# Patient Record
Sex: FEMALE
[De-identification: names, ages, dates, MRNs, and addresses within clinical notes are randomized per-mention and may not be internally consistent; named-entity substitution may affect disease eponyms.]

---

## 2023-07-31 PROBLEM — Z00.00 ENCOUNTER FOR PREVENTIVE HEALTH EXAMINATION: Status: ACTIVE | Noted: 2023-07-31

## 2023-08-10 ENCOUNTER — APPOINTMENT (OUTPATIENT)
Dept: ENDOCRINOLOGY | Facility: CLINIC | Age: 58
End: 2023-08-10
Payer: COMMERCIAL

## 2023-08-10 VITALS
HEART RATE: 72 BPM | WEIGHT: 161 LBS | HEIGHT: 61 IN | SYSTOLIC BLOOD PRESSURE: 118 MMHG | DIASTOLIC BLOOD PRESSURE: 80 MMHG | BODY MASS INDEX: 30.4 KG/M2

## 2023-08-10 DIAGNOSIS — E66.9 TYPE 2 DIABETES MELLITUS WITH OTHER SPECIFIED COMPLICATION: ICD-10-CM

## 2023-08-10 DIAGNOSIS — E04.2 NONTOXIC MULTINODULAR GOITER: ICD-10-CM

## 2023-08-10 DIAGNOSIS — E11.69 TYPE 2 DIABETES MELLITUS WITH OTHER SPECIFIED COMPLICATION: ICD-10-CM

## 2023-08-10 PROCEDURE — 82962 GLUCOSE BLOOD TEST: CPT

## 2023-08-10 PROCEDURE — 99205 OFFICE O/P NEW HI 60 MIN: CPT | Mod: 25

## 2023-08-10 PROCEDURE — 83036 HEMOGLOBIN GLYCOSYLATED A1C: CPT | Mod: QW

## 2023-08-10 NOTE — REASON FOR VISIT
[Initial Evaluation] : an initial evaluation [DM Type 2] : DM Type 2 [Thyroid nodule/ MNG] : thyroid nodule/ MNG [FreeTextEntry2] : KANDY Moon

## 2023-08-10 NOTE — REVIEW OF SYSTEMS
[Negative] : Heme/Lymph [As Noted in HPI] : as noted in HPI [Dysphagia] : no dysphagia [Difficulty Breathing] : no dyspnea [FreeTextEntry4] : denies voice changes [FreeTextEntry9] : endorses body aches

## 2023-08-10 NOTE — HISTORY OF PRESENT ILLNESS
[FreeTextEntry1] : Patient is a 57-year-old F with history of thyroid nodules (noted ~2021) and T2DM (dx ~2013), referred by Dr. Juan Moon presents today to establish endocrine care.  #Thyroid Nodule (dx 2021) PMHx: fibromyalgia, osteoarthritis, DM (dx ~2013), basal cell carcinoma, HTN, HLD   No PMHx: thyroid dysfunction, no biopsies, bone fractures  No FHx: thyroid cancer, or thyroid cancer syndromes ([MEN2], familial adenomatous polyposis, or Cowden syndrome).   #T2DM (dx ~2013) Pertinent PMHx: No hx of gout, fatty liver disease, sleep apnea, depression; Hx of osteoarthritis Other PMHx: thyroid nodules (dx 2021), fibromyalgia, osteoarthritis, basal cell carcinoma, HTN, HLD   FHx: Mother and Father: DM  PSHx: appendectomy SHx: youngest child 31 y/o Allergies: Penicillin   08/10/2023 Pt has A1c 6.4%, POCT 100, /80, and BMI 30.42.  CC: "I am here because my doctor said I need someone to look at my thyroid nodule" Pt reports her thyroid nodule was originally noted during a routine physical examination in 2021. However, in May, pt states she went in for a routine PCP visit where it was noted once again.  Pt is not aware of any DM related complications. She reports not checking her BS for a while.  She states she has seen her ophthalmologist within the last year.  Pt denies difficulty breathing, difficulty swallowing, and voice changes. Pt endorsees body aches.  Review of imaging: - 06/24/21 Thyroid US: Impression 9 mm left lower pole hypoechoic nodule can be seen again in 2 years.   [Medications verified as per pt on 08/10/2023]  Current Medication: Metformin 1000 mg bid, Atorvastatin 20 mg qd, Lisinopril/ACTZ 20-12.5 qd, Famotidine

## 2023-08-10 NOTE — ADDENDUM
[FreeTextEntry1] : I, Brigida Osborne, act soley as a scribe for Dr. Cl Bansal on this date. 08/10/2023

## 2023-08-10 NOTE — PHYSICAL EXAM
[No Acute Distress] : no acute distress [Normal Sclera/Conjunctiva] : normal sclera/conjunctiva [Normal Outer Ear/Nose] : the ears and nose were normal in appearance [Clear to Auscultation] : lungs were clear to auscultation bilaterally [Normal Rate] : heart rate was normal [Regular Rhythm] : with a regular rhythm [No Edema] : no peripheral edema [Pedal Pulses Normal] : the pedal pulses are present [Soft] : abdomen soft [Spine Straight] : spine straight [No Stigmata of Cushings Syndrome] : no stigmata of Cushings Syndrome [Normal Gait] : normal gait [Normal Strength/Tone] : muscle strength and tone were normal [No Rash] : no rash [Normal Reflexes] : deep tendon reflexes were 2+ and symmetric [No Tremors] : no tremors [Oriented x3] : oriented to person, place, and time [Acanthosis Nigricans] : no acanthosis nigricans [de-identified] : Right lobe nodules palpated.

## 2023-08-10 NOTE — DATA REVIEWED
[FreeTextEntry1] : Imaging:  - 06/24/21 Thyroid US: Impression 9 mm left lower pole hypoechoic nodule can be seen again in 2 years.

## 2023-08-10 NOTE — END OF VISIT
[FreeTextEntry3] : All medical record entries made by the Scribe were at my, Dr. Cl Bansal, direction and personally dictated by me on 08/10/2023. I have reviewed the chart and agree that the record accurately reflects my personal performance of the history, physical exam, assessment and plan. I have also personally directed, reviewed and agreed with the chart.  [Time Spent: ___ minutes] : I have spent [unfilled] minutes of time on the encounter.

## 2023-09-18 LAB
GLUCOSE BLDC GLUCOMTR-MCNC: 100
HBA1C MFR BLD HPLC: 6.4

## 2023-11-08 ENCOUNTER — APPOINTMENT (OUTPATIENT)
Dept: ENDOCRINOLOGY | Facility: CLINIC | Age: 58
End: 2023-11-08

## 2025-03-12 ENCOUNTER — APPOINTMENT (OUTPATIENT)
Dept: ENDOCRINOLOGY | Facility: CLINIC | Age: 60
End: 2025-03-12

## 2025-03-12 VITALS
DIASTOLIC BLOOD PRESSURE: 85 MMHG | SYSTOLIC BLOOD PRESSURE: 136 MMHG | HEART RATE: 76 BPM | WEIGHT: 163 LBS | BODY MASS INDEX: 30.8 KG/M2

## 2025-03-12 DIAGNOSIS — E66.9 TYPE 2 DIABETES MELLITUS WITH OTHER SPECIFIED COMPLICATION: ICD-10-CM

## 2025-03-12 DIAGNOSIS — E11.9 TYPE 2 DIABETES MELLITUS W/OUT COMPLICATIONS: ICD-10-CM

## 2025-03-12 DIAGNOSIS — E04.2 NONTOXIC MULTINODULAR GOITER: ICD-10-CM

## 2025-03-12 DIAGNOSIS — E11.69 TYPE 2 DIABETES MELLITUS WITH OTHER SPECIFIED COMPLICATION: ICD-10-CM

## 2025-03-12 PROCEDURE — 99214 OFFICE O/P EST MOD 30 MIN: CPT

## 2025-03-12 PROCEDURE — 82962 GLUCOSE BLOOD TEST: CPT

## 2025-03-12 RX ORDER — METFORMIN HYDROCHLORIDE 1000 MG/1
1000 TABLET, COATED ORAL TWICE DAILY
Qty: 180 | Refills: 3 | Status: ACTIVE | COMMUNITY
Start: 2025-03-12 | End: 1900-01-01

## 2025-03-13 PROBLEM — E11.9 TYPE 2 DIABETES MELLITUS WITHOUT COMPLICATION, WITHOUT LONG-TERM CURRENT USE OF INSULIN: Status: ACTIVE | Noted: 2025-03-12

## 2025-03-23 LAB — GLUCOSE BLDC GLUCOMTR-MCNC: 109
